# Patient Record
Sex: MALE | Race: WHITE | NOT HISPANIC OR LATINO | Employment: OTHER | URBAN - METROPOLITAN AREA
[De-identification: names, ages, dates, MRNs, and addresses within clinical notes are randomized per-mention and may not be internally consistent; named-entity substitution may affect disease eponyms.]

---

## 2018-05-17 ENCOUNTER — OFFICE VISIT (OUTPATIENT)
Dept: NEUROSURGERY | Facility: CLINIC | Age: 68
End: 2018-05-17

## 2018-05-17 VITALS
TEMPERATURE: 98.9 F | HEIGHT: 73 IN | WEIGHT: 218 LBS | BODY MASS INDEX: 28.89 KG/M2 | SYSTOLIC BLOOD PRESSURE: 128 MMHG | DIASTOLIC BLOOD PRESSURE: 68 MMHG

## 2018-05-17 DIAGNOSIS — I73.9 CLAUDICATION IN PERIPHERAL VASCULAR DISEASE (HCC): ICD-10-CM

## 2018-05-17 DIAGNOSIS — M79.604 PAIN IN BOTH LOWER EXTREMITIES: ICD-10-CM

## 2018-05-17 DIAGNOSIS — M79.605 PAIN IN BOTH LOWER EXTREMITIES: ICD-10-CM

## 2018-05-17 DIAGNOSIS — M53.9 MULTILEVEL DEGENERATIVE DISC DISEASE: Primary | ICD-10-CM

## 2018-05-17 PROCEDURE — 99204 OFFICE O/P NEW MOD 45 MIN: CPT | Performed by: NEUROLOGICAL SURGERY

## 2018-05-17 RX ORDER — VALSARTAN 160 MG/1
TABLET ORAL
COMMUNITY
Start: 2018-05-01

## 2018-05-17 RX ORDER — IBUPROFEN 600 MG/1
600 TABLET ORAL EVERY 6 HOURS PRN
COMMUNITY

## 2018-05-17 NOTE — PROGRESS NOTES
NAME: RUI WILLETT JR.   DOS: 2018  : 1950  PCP: Berto French MD    Chief Complaint:  Back and right lower extremity pain  Chief Complaint   Patient presents with   • Back Pain       History of Present Illness:  67 y.o. male   This is a very pleasant 67-year-old smoker who is a history of chronic axial back pain for a number of years.  More recently in the last few years he's experiencing increasing back pain with radiation to the right buttock hip and leg.  He has history of significant trauma in the popliteal fossa but nothing recently to suggest worsening in that region.  He says that walking feels like he is standing on gravel in the right lower extremity is alleviated it sometimes with standing and sometimes with sitting.  Sometimes if he bends over a cart it is better as well.    He denies any cauda equina syndrome he's here for evaluation    PMHX  Allergies:  No Known Allergies  Medications    Current Outpatient Prescriptions:   •  ibuprofen (ADVIL,MOTRIN) 600 MG tablet, Take 600 mg by mouth Every 6 (Six) Hours As Needed for Mild Pain ., Disp: , Rfl:   •  valsartan (DIOVAN) 160 MG tablet, , Disp: , Rfl:   Past Medical History:  Past Medical History:   Diagnosis Date   • Hypertension      Past Surgical History:  Past Surgical History:   Procedure Laterality Date   • CHOLECYSTECTOMY       Social Hx:  Social History   Substance Use Topics   • Smoking status: Current Every Day Smoker     Packs/day: 1.00     Types: Cigarettes   • Smokeless tobacco: Never Used   • Alcohol use Yes     Family Hx:  Family History   Problem Relation Age of Onset   • No Known Problems Mother    • Heart attack Father      Review of Systems:        Review of Systems   Constitutional: Negative for activity change, appetite change, chills, diaphoresis, fatigue, fever and unexpected weight change.   HENT: Positive for hearing loss and tinnitus. Negative for congestion, dental problem, drooling, ear discharge, ear  pain, facial swelling, mouth sores, nosebleeds, postnasal drip, rhinorrhea, sinus pressure, sneezing, sore throat, trouble swallowing and voice change.    Eyes: Negative for photophobia, pain, discharge, redness, itching and visual disturbance.   Respiratory: Positive for cough and shortness of breath. Negative for apnea, choking, chest tightness, wheezing and stridor.    Cardiovascular: Negative for chest pain, palpitations and leg swelling.   Gastrointestinal: Negative for abdominal distention, abdominal pain, anal bleeding, blood in stool, constipation, diarrhea, nausea, rectal pain and vomiting.   Endocrine: Negative for cold intolerance, heat intolerance, polydipsia, polyphagia and polyuria.   Genitourinary: Negative for decreased urine volume, difficulty urinating, dysuria, enuresis, flank pain, frequency, genital sores, hematuria and urgency.   Musculoskeletal: Positive for back pain. Negative for arthralgias, gait problem, joint swelling, myalgias, neck pain and neck stiffness.   Skin: Negative for color change, pallor, rash and wound.   Allergic/Immunologic: Negative for environmental allergies, food allergies and immunocompromised state.   Neurological: Positive for numbness. Negative for dizziness, tremors, seizures, syncope, facial asymmetry, speech difficulty, weakness, light-headedness and headaches.   Hematological: Negative for adenopathy. Does not bruise/bleed easily.   Psychiatric/Behavioral: Negative for agitation, behavioral problems, confusion, decreased concentration, dysphoric mood, hallucinations, self-injury, sleep disturbance and suicidal ideas. The patient is not nervous/anxious and is not hyperactive.         Review of systemsI have reviewed this note template and all pertinent parts of the review of systems social, family history, surgical history and medication list    Physical Examination:  Vitals:    05/17/18 0919   BP: 128/68   Temp: 98.9 °F (37.2 °C)      General Appearance:   Well  developed, well nourished, well groomed, alert, and cooperative.  Neurological examination:  Neurologic Exam  Vital signs were reviewed and documented in the chart  Patient appeared in good neurologic function with normal comprehension fluent speech  Mood and affect are normal  Sense of smell deferred    Pupils symmetric equally reactive funduscopic exam not visualized   Visual fields intact to confrontation  Extraocular movements intact  Face motor function is symmetric  Facial sensations normal  Hearing intact to finger rub hearing intact to finger rub  Tongue is midline  Palate symmetric  Swallowing normal  Shoulder shrug normal    Muscle bulk and tone normal  5 out of 5 strength no motor drift  Gait normal intact  Negative Romberg  No clonus long tract signs or myelopathy  Operative fossa is with a large gash there is no palpable pulse    Vibratory sensations decreased throughout with normal cold sensation in the hands and lower extremities    He has relatively good reflexes at the knees they are absent at the ankles he has no bruits in the femoral area but he has decreased pulses in his right lower extremity  Reflexes symmetric  No edema noted and extremities skin appears normal    Straight leg raise sign absent  No signs of intrinsic hip dysfunction  Back is without any lesions or abnormality  Feet are warm and well perfused but again with poor right peripheral pulse        Review of Imaging/DATA:  Reviewed his MRIs get intraforaminal significant disease at L5-S1 with multilevel degenerative disc disease and scoliosis  Diagnoses/Plan:    Mr. Farrell is a 67 y.o. male   He clearly has claudication he's got vascular risk factors he's got a good femoral pulse but absent dorsalis pedis on the right I think he needs an ankle brachial index of his right lower extremity especially given his right-sided popliteal trauma I didn't hear a bruit at this area    His MRI shows intraforaminal right-sided L5-S1 disease.    I  explained the importance of smoking cessation to the patient explaining that smoking decreases the efficacy of surgical therapies not to mention the general health risks.  In addition to this when contemplating fusion surgeries smoking decreases fusion rates and leads to poor outcome, and contributes to complication rate.    We also check lumbar flexion extension film refer him for physical therapy and get an EMG nerve conduction study to confirm his neuropathy which is undoubtedly presents.  I didn't detect any myelopathic findings will see him back in about 6 weeks he may be a candidate for transforaminal decompression on the right

## 2018-06-28 ENCOUNTER — OFFICE VISIT (OUTPATIENT)
Dept: NEUROSURGERY | Facility: CLINIC | Age: 68
End: 2018-06-28

## 2018-06-28 VITALS
WEIGHT: 216 LBS | SYSTOLIC BLOOD PRESSURE: 142 MMHG | HEIGHT: 73 IN | DIASTOLIC BLOOD PRESSURE: 78 MMHG | TEMPERATURE: 97.9 F | BODY MASS INDEX: 28.63 KG/M2

## 2018-06-28 DIAGNOSIS — M53.9 MULTILEVEL DEGENERATIVE DISC DISEASE: ICD-10-CM

## 2018-06-28 DIAGNOSIS — M48.061 SPINAL STENOSIS OF LUMBAR REGION WITHOUT NEUROGENIC CLAUDICATION: Primary | ICD-10-CM

## 2018-06-28 DIAGNOSIS — M54.16 LUMBAR RADICULOPATHY: ICD-10-CM

## 2018-06-28 PROCEDURE — 99214 OFFICE O/P EST MOD 30 MIN: CPT | Performed by: NEUROLOGICAL SURGERY

## 2018-06-28 NOTE — PROGRESS NOTES
NAME: RUI WILLETT JR.   DOS: 2018  : 1950  PCP: Berto French MD    Chief Complaint: Follow-up right lower extremity pain bilateral numbness  Chief Complaint   Patient presents with   • Back Pain     F/u EMG/FABI/XR       History of Present Illness:  67 y.o. male   This is a 67-year-old gentleman with a history of bilateral but mostly right-sided hip and leg pain the pain radiates down the leg it's consistent with some overtones of neurogenic claudication he is overall getting better with physical therapy he's here for follow-up he continues to smoke    PMHX  Allergies:  No Known Allergies  Medications    Current Outpatient Prescriptions:   •  ibuprofen (ADVIL,MOTRIN) 600 MG tablet, Take 600 mg by mouth Every 6 (Six) Hours As Needed for Mild Pain ., Disp: , Rfl:   •  valsartan (DIOVAN) 160 MG tablet, , Disp: , Rfl:   Past Medical History:  Past Medical History:   Diagnosis Date   • Hypertension      Past Surgical History:  Past Surgical History:   Procedure Laterality Date   • CHOLECYSTECTOMY       Social Hx:  Social History   Substance Use Topics   • Smoking status: Current Every Day Smoker     Packs/day: 1.00     Types: Cigarettes   • Smokeless tobacco: Never Used   • Alcohol use Yes     Family Hx:  Family History   Problem Relation Age of Onset   • No Known Problems Mother    • Heart attack Father      Review of Systems:        Review of Systems   Constitutional: Negative for activity change, appetite change, chills, diaphoresis, fatigue, fever and unexpected weight change.   HENT: Positive for hearing loss and tinnitus. Negative for congestion, dental problem, drooling, ear discharge, ear pain, facial swelling, mouth sores, nosebleeds, postnasal drip, rhinorrhea, sinus pressure, sneezing, sore throat, trouble swallowing and voice change.    Eyes: Negative for photophobia, pain, discharge, redness, itching and visual disturbance.   Respiratory: Positive for cough and shortness of breath.  Negative for apnea, choking, chest tightness, wheezing and stridor.    Cardiovascular: Negative for chest pain, palpitations and leg swelling.   Gastrointestinal: Negative for abdominal distention, abdominal pain, anal bleeding, blood in stool, constipation, diarrhea, nausea, rectal pain and vomiting.   Endocrine: Negative for cold intolerance, heat intolerance, polydipsia, polyphagia and polyuria.   Genitourinary: Negative for decreased urine volume, difficulty urinating, dysuria, enuresis, flank pain, frequency, genital sores, hematuria and urgency.   Musculoskeletal: Positive for back pain. Negative for arthralgias, gait problem, joint swelling, myalgias, neck pain and neck stiffness.   Skin: Negative for color change, pallor, rash and wound.   Allergic/Immunologic: Negative for environmental allergies, food allergies and immunocompromised state.   Neurological: Positive for numbness. Negative for dizziness, tremors, seizures, syncope, facial asymmetry, speech difficulty, weakness, light-headedness and headaches.   Hematological: Negative for adenopathy. Does not bruise/bleed easily.   Psychiatric/Behavioral: Negative for agitation, behavioral problems, confusion, decreased concentration, dysphoric mood, hallucinations, self-injury, sleep disturbance and suicidal ideas. The patient is not nervous/anxious and is not hyperactive.             Physical Examination:  Vitals:    06/28/18 1037   BP: 142/78   Temp: 97.9 °F (36.6 °C)      General Appearance:   Well developed, well nourished, well groomed, alert, and cooperative.  Neurological examination:  Neurologic Exam  He has good strength in his lower extremities he has no evidence of gross hyperreflexia but he does have brisk knee reflexes he has no clonus his gait is wide-based stable and normal    Review of Imaging/DATA:  I reviewed his old MRIs and x-rays he has interforaminal L5-S1 disease on the right side EMG nerve conduction study confirms an L5-S1 chronic  radiculopathy and he has an FABI examination that unremarkable for perfusion dependent diagnosis of vascular disease.    Diagnoses/Plan:    Mr. Farrell is a 67 y.o. male this patient represents with a history of some neurogenic claudication symptoms referrable to right-sided probable intraforaminal disease I think that he is better in terms of how he was at her last visit particular with physical therapy and nonsteroidal anti-inflammatories.  From my standpoint I think he needs to continue to work on smoking cessation.  I explained the importance of smoking cessation to the patient explaining that smoking decreases the efficacy of surgical therapies not to mention the general health risks.  In addition to this when contemplating fusion surgeries smoking decreases fusion rates and leads to poor outcome, and contributes to complication rate..    I think he may be a candidate for exploration of his right transforaminal area at L5-S1 but I'm really reluctant to offer him that surgery other than as a last option many times patients require lumbar fusions at this area hip and the facet arthropathy the contributes to the pressure I think that there is nothing on his scans that leads me to believe that he should be able to continue his current activities additionally perhaps a transforaminal block would be in order if his symptoms persist.  A be a pleasure to provide him follow up anytime in the future he's get a call me if he needs to be seen again

## 2023-11-20 ENCOUNTER — OFFICE VISIT (OUTPATIENT)
Dept: CARDIAC SURGERY | Facility: CLINIC | Age: 73
End: 2023-11-20
Payer: MEDICARE

## 2023-11-20 VITALS
HEIGHT: 73 IN | TEMPERATURE: 97.8 F | DIASTOLIC BLOOD PRESSURE: 62 MMHG | HEART RATE: 78 BPM | WEIGHT: 202.2 LBS | SYSTOLIC BLOOD PRESSURE: 120 MMHG | BODY MASS INDEX: 26.8 KG/M2 | OXYGEN SATURATION: 99 %

## 2023-11-20 DIAGNOSIS — R91.8 LUNG NODULES: Primary | ICD-10-CM

## 2023-11-20 PROCEDURE — 99204 OFFICE O/P NEW MOD 45 MIN: CPT | Performed by: THORACIC SURGERY (CARDIOTHORACIC VASCULAR SURGERY)

## 2023-11-20 RX ORDER — OLMESARTAN MEDOXOMIL 20 MG/1
TABLET ORAL
COMMUNITY
Start: 2023-11-15

## 2023-11-20 RX ORDER — MONTELUKAST SODIUM 10 MG/1
TABLET ORAL
COMMUNITY

## 2023-11-20 RX ORDER — LEVOTHYROXINE SODIUM 0.03 MG/1
TABLET ORAL
COMMUNITY
Start: 2023-11-06

## 2023-11-21 DIAGNOSIS — R91.1 LUNG NODULE: Primary | ICD-10-CM

## 2023-11-22 ENCOUNTER — PATIENT ROUNDING (BHMG ONLY) (OUTPATIENT)
Dept: CARDIAC SURGERY | Facility: CLINIC | Age: 73
End: 2023-11-22
Payer: MEDICARE

## 2023-11-22 NOTE — PROGRESS NOTES
November 22, 2023    Hello, may I speak with Edvin Farrell Jr.?    My name is Virginia    I am  with [facility]  [address]  [facility] [address] [phone].    Before we get started may I verify your date of birth? 1950    I am calling to officially welcome you to our practice and ask about your recent visit. Is this a good time to talk? yes    Tell me about your visit with us. What things went well?  Every thing went well.        We're always looking for ways to make our patients' experiences even better. Do you have recommendations on ways we may improve?  no    Overall were you satisfied with your first visit to our practice? yes       I appreciate you taking the time to speak with me today. Is there anything else I can do for you? no      Thank you, and have a great day.

## 2023-12-05 ENCOUNTER — TELEPHONE (OUTPATIENT)
Dept: INFUSION THERAPY | Facility: HOSPITAL | Age: 73
End: 2023-12-05
Payer: MEDICARE

## 2023-12-05 NOTE — TELEPHONE ENCOUNTER
Pt contacted as pre-procedure phone call prior to planned right lung biopsy for 12/7/23. Reviewed with patient arrival time, location, nothing to eat or drink by mouth after midnight Wednesday, okay to take blood pressure medications morning of procedure with a small sip of water,  needed, reviewed procedure instructions and allowed time for questions, and reviewed home medications, allergies, and medical history.

## 2023-12-07 ENCOUNTER — HOSPITAL ENCOUNTER (OUTPATIENT)
Dept: CT IMAGING | Facility: HOSPITAL | Age: 73
Discharge: HOME | End: 2023-12-07
Payer: MEDICARE

## 2023-12-07 VITALS
DIASTOLIC BLOOD PRESSURE: 66 MMHG | SYSTOLIC BLOOD PRESSURE: 115 MMHG | HEART RATE: 73 BPM | HEIGHT: 73 IN | OXYGEN SATURATION: 95 % | WEIGHT: 196.8 LBS | TEMPERATURE: 98.9 F | RESPIRATION RATE: 18 BRPM | BODY MASS INDEX: 26.08 KG/M2

## 2023-12-07 DIAGNOSIS — R91.1 LUNG NODULE: ICD-10-CM

## 2023-12-07 LAB
BASOPHILS # BLD AUTO: 0.05 10*3/MM3 (ref 0–0.2)
BASOPHILS NFR BLD AUTO: 0.9 % (ref 0–1.5)
DEPRECATED RDW RBC AUTO: 51.2 FL (ref 37–54)
EOSINOPHIL # BLD AUTO: 0.09 10*3/MM3 (ref 0–0.4)
EOSINOPHIL NFR BLD AUTO: 1.7 % (ref 0.3–6.2)
ERYTHROCYTE [DISTWIDTH] IN BLOOD BY AUTOMATED COUNT: 13.6 % (ref 12.3–15.4)
HCT VFR BLD AUTO: 37.3 % (ref 37.5–51)
HGB BLD-MCNC: 12.4 G/DL (ref 13–17.7)
IMM GRANULOCYTES # BLD AUTO: 0.02 10*3/MM3 (ref 0–0.05)
IMM GRANULOCYTES NFR BLD AUTO: 0.4 % (ref 0–0.5)
INR PPP: 1.11 (ref 0.89–1.12)
LYMPHOCYTES # BLD AUTO: 1.01 10*3/MM3 (ref 0.7–3.1)
LYMPHOCYTES NFR BLD AUTO: 19.2 % (ref 19.6–45.3)
MCH RBC QN AUTO: 33.9 PG (ref 26.6–33)
MCHC RBC AUTO-ENTMCNC: 33.2 G/DL (ref 31.5–35.7)
MCV RBC AUTO: 101.9 FL (ref 79–97)
MONOCYTES # BLD AUTO: 0.69 10*3/MM3 (ref 0.1–0.9)
MONOCYTES NFR BLD AUTO: 13.1 % (ref 5–12)
NEUTROPHILS NFR BLD AUTO: 3.41 10*3/MM3 (ref 1.7–7)
NEUTROPHILS NFR BLD AUTO: 64.7 % (ref 42.7–76)
NRBC BLD AUTO-RTO: 0 /100 WBC (ref 0–0.2)
PLATELET # BLD AUTO: 230 10*3/MM3 (ref 140–450)
PMV BLD AUTO: 10 FL (ref 6–12)
PROTHROMBIN TIME: 14.4 SECONDS (ref 12.2–14.5)
RBC # BLD AUTO: 3.66 10*6/MM3 (ref 4.14–5.8)
WBC NRBC COR # BLD AUTO: 5.27 10*3/MM3 (ref 3.4–10.8)

## 2023-12-07 PROCEDURE — 85610 PROTHROMBIN TIME: CPT | Performed by: RADIOLOGY

## 2023-12-07 PROCEDURE — 25010000002 MIDAZOLAM PER 1 MG: Performed by: RADIOLOGY

## 2023-12-07 PROCEDURE — 25010000002 LIDOCAINE 1 % SOLUTION: Performed by: RADIOLOGY

## 2023-12-07 PROCEDURE — 85025 COMPLETE CBC W/AUTO DIFF WBC: CPT | Performed by: RADIOLOGY

## 2023-12-07 PROCEDURE — 25010000002 FENTANYL CITRATE (PF) 50 MCG/ML SOLUTION: Performed by: RADIOLOGY

## 2023-12-07 PROCEDURE — 99152 MOD SED SAME PHYS/QHP 5/>YRS: CPT

## 2023-12-07 RX ORDER — FENTANYL CITRATE 50 UG/ML
INJECTION, SOLUTION INTRAMUSCULAR; INTRAVENOUS AS NEEDED
Status: COMPLETED | OUTPATIENT
Start: 2023-12-07 | End: 2023-12-07

## 2023-12-07 RX ORDER — MIDAZOLAM HYDROCHLORIDE 1 MG/ML
INJECTION INTRAMUSCULAR; INTRAVENOUS AS NEEDED
Status: COMPLETED | OUTPATIENT
Start: 2023-12-07 | End: 2023-12-07

## 2023-12-07 RX ORDER — MIDAZOLAM HYDROCHLORIDE 1 MG/ML
INJECTION INTRAMUSCULAR; INTRAVENOUS
Status: DISPENSED
Start: 2023-12-07 | End: 2023-12-07

## 2023-12-07 RX ORDER — LIDOCAINE HYDROCHLORIDE 10 MG/ML
10 INJECTION, SOLUTION INFILTRATION; PERINEURAL ONCE
Status: COMPLETED | OUTPATIENT
Start: 2023-12-07 | End: 2023-12-07

## 2023-12-07 RX ORDER — FENTANYL CITRATE 50 UG/ML
INJECTION, SOLUTION INTRAMUSCULAR; INTRAVENOUS
Status: DISPENSED
Start: 2023-12-07 | End: 2023-12-07

## 2023-12-07 RX ORDER — MORPHINE SULFATE 2 MG/ML
2 INJECTION, SOLUTION INTRAMUSCULAR; INTRAVENOUS
Status: DISCONTINUED | OUTPATIENT
Start: 2023-12-07 | End: 2023-12-08 | Stop reason: HOSPADM

## 2023-12-07 RX ORDER — SODIUM CHLORIDE 0.9 % (FLUSH) 0.9 %
10 SYRINGE (ML) INJECTION EVERY 12 HOURS SCHEDULED
Status: DISCONTINUED | OUTPATIENT
Start: 2023-12-07 | End: 2023-12-08 | Stop reason: HOSPADM

## 2023-12-07 RX ORDER — HYDROCODONE BITARTRATE AND ACETAMINOPHEN 5; 325 MG/1; MG/1
1 TABLET ORAL EVERY 4 HOURS PRN
Status: DISCONTINUED | OUTPATIENT
Start: 2023-12-07 | End: 2023-12-08 | Stop reason: HOSPADM

## 2023-12-07 RX ORDER — SODIUM CHLORIDE 0.9 % (FLUSH) 0.9 %
10 SYRINGE (ML) INJECTION AS NEEDED
Status: DISCONTINUED | OUTPATIENT
Start: 2023-12-07 | End: 2023-12-08 | Stop reason: HOSPADM

## 2023-12-07 RX ADMIN — MIDAZOLAM HYDROCHLORIDE 1 MG: 1 INJECTION, SOLUTION INTRAMUSCULAR; INTRAVENOUS at 08:40

## 2023-12-07 RX ADMIN — FENTANYL CITRATE 50 MCG: 50 INJECTION, SOLUTION INTRAMUSCULAR; INTRAVENOUS at 08:39

## 2023-12-07 RX ADMIN — LIDOCAINE HYDROCHLORIDE 10 ML: 10 INJECTION, SOLUTION INFILTRATION; PERINEURAL at 08:59

## 2023-12-07 NOTE — PRE-PROCEDURE NOTE
"[facility]   Vascular Interventional Radiology  History & Physicial        Patient Name:Edvin Farrell Jr.    : 1950    MRN: [id number]    Primary Care Physician: Berto French MD    Referring Physician: Killian Hilton MD     Date of admission: 2023    Subjective     Reason for Consult: Lung biopsy    History of Present Illness     Edvin Farrell Jr. is a 73 y.o. male referred to IR as noted above.      Active Hospital Problems:  There are no active hospital problems to display for this patient.      Personal History     Past Medical History:   Diagnosis Date    Hypertension     Hypothyroidism        Past Surgical History:   Procedure Laterality Date    CHOLECYSTECTOMY      PELVIC FRACTURE SURGERY         Family History: His family history includes Heart attack in his father; No Known Problems in his mother.     Social History: He  reports that he has been smoking cigarettes. He has a 50.00 pack-year smoking history. He has never used smokeless tobacco. He reports that he does not drink alcohol and does not use drugs.    Home Medications:  ibuprofen, levothyroxine, montelukast, olmesartan, and valsartan    Current Medications:    fentaNYL citrate (PF)    midazolam    sodium chloride    sodium chloride     Allergies:  No Known Allergies    Review of Systems    IR Procedure pertinent significant findings are mentioned in the PMH and PSH above.    Objective     Visit Vitals  /70   Pulse 96   Temp 98.9 °F (37.2 °C)   Resp 18   Ht 185.4 cm (73\")   Wt 89.3 kg (196 lb 12.8 oz)   SpO2 94%   BMI 25.96 kg/m²        Physical Exam    A&Ox3.   Able to communicate  No Apparent Distress  Average physique   CVS: VS as noted. Chart reviewed. Stable for the procedure.  Respiratory: Non labored breathing. No signs of respiratory compromise.    Result Review      I have personally reviewed the results from the time of this admission to 2023 08:03 EST and agree with these findings.  [x]  Laboratory  [] " " Microbiology  [x]  Radiology  []  EKG/Telemetry   []  Cardiology/Vascular   []  Pathology  []  Old records  []  Other:    Most notable findings include: As noted:    Results from last 7 days   Lab Units 12/07/23  0723   INR  1.11   WBC 10*3/mm3 5.27   HEMOGLOBIN g/dL 12.4*   HEMATOCRIT % 37.3*   PLATELETS 10*3/mm3 230                   CrCl cannot be calculated (No successful lab value found.). No results found for: \"CREATININE\"    No results found for: \"COVID19\"     No results found for: \"PREGTESTUR\", \"PREGSERUM\", \"HCG\", \"HCGQUANT\"     ASA SCALE ASSESSMENT (applicable ONLY if sedation planned):   1     MALLAMPATI CLASSIFICATION (applicable ONLY if sedation planned):   1    Assessment / Plan     Edvin Farrell Jr. is a 73 y.o. male referred to the IR service with above problem.    Plan:   As above.    Notice: The note was created before the performance of the procedure. It might have been left in the pending status and signed off after the procedure. The time stamp on the note may be misleading.    Raheem Smith MD   Vascular Interventional Radiology  12/07/23   8:03 AM EST     "

## 2023-12-07 NOTE — DISCHARGE INSTRUCTIONS
No heavy lifting or strenuous activity x 3 days.     Resume regular diet and medications.    May remove dressing and shower tomorrow.     Ordering physician will discuss biopsy results with you     If you develop chest pain or difficulty breathing, call 911 or go the emergency room

## 2023-12-07 NOTE — POST-PROCEDURE NOTE
The following procedure was performed: R lung bx    Please see corresponding Radiology report for in detail procedural information. The Radiology report will be dictated shortly, if not done so already. Please see the IR RN note for the information regarding medicines administered if any, deisy-procedural vitals and I/O information.

## 2023-12-07 NOTE — NURSING NOTE
Image guided right lung biopsy per Dr Smith. Patient tolerated procedure well. VSS. 1mg of versded and 50mcg of fentanyl administered for sedation time of 12 min. . Report called to [facility].

## 2023-12-08 ENCOUNTER — TELEPHONE (OUTPATIENT)
Dept: INFUSION THERAPY | Facility: HOSPITAL | Age: 73
End: 2023-12-08
Payer: MEDICARE

## 2023-12-11 LAB
CYTO UR: NORMAL
LAB AP CASE REPORT: NORMAL
LAB AP CLINICAL INFORMATION: NORMAL
LAB AP DIAGNOSIS COMMENT: NORMAL
PATH REPORT.ADDENDUM SPEC: NORMAL
PATH REPORT.FINAL DX SPEC: NORMAL
PATH REPORT.GROSS SPEC: NORMAL

## 2023-12-14 PROBLEM — R91.8 LUNG MASS: Status: ACTIVE | Noted: 2023-12-14

## 2023-12-18 NOTE — PROGRESS NOTES
[facility] Cardiothoracic Surgery Office Follow Up Note     Date of Encounter: 2023     Name: Edvin Farrell Jr.  : 1950     Referred By: No ref. provider found  PCP: Berto French MD    Chief Complaint:    Chief Complaint   Patient presents with    Lung Nodule     Follow up after right lung biopsy.       Subjective      History of Present Illness:    Edvin Farrell Jr. is a 73 y.o. male with a past medical history of current smoker, hypertension, hypothyroidism, and family history of bronchogenic carcinoma.  He was referred to Dr. Hilton on 2023 and was noted to have multiple pulmonary nodules largest noted on the right and underwent needle biopsy on 23.  Pathology was consistent with squamous cell carcinoma.  He presents today in office for follow-up and to discuss pathology results.  He states he has been doing well at home.  Denies any hemoptysis, unintentional weight loss, or lymphadenopathy. He does unfortunately continue to smoke, he states he does want to quit.    Review of Systems:  Review of Systems   Constitutional: Negative. Negative for chills, decreased appetite, diaphoresis, fever, malaise/fatigue, night sweats and weight loss.   HENT: Negative.  Negative for congestion, hoarse voice, sore throat and stridor.    Cardiovascular: Negative.  Negative for chest pain, claudication, dyspnea on exertion, irregular heartbeat, leg swelling, near-syncope, orthopnea, palpitations, paroxysmal nocturnal dyspnea and syncope.   Respiratory:  Positive for cough and sputum production. Negative for hemoptysis, shortness of breath, sleep disturbances due to breathing, snoring and wheezing.    Hematologic/Lymphatic: Negative.  Negative for adenopathy and bleeding problem. Does not bruise/bleed easily.   Skin: Negative.  Negative for color change, dry skin, itching, poor wound healing and rash.   Musculoskeletal:  Positive for back pain. Negative for arthritis, falls and muscle  weakness.   Gastrointestinal: Negative.  Negative for abdominal pain, anorexia, constipation, diarrhea, hematochezia, melena, nausea and vomiting.   Neurological: Negative.  Negative for difficulty with concentration, disturbances in coordination, dizziness, loss of balance, numbness, seizures, vertigo and weakness.   Psychiatric/Behavioral: Negative.  Negative for altered mental status, depression, memory loss and substance abuse. The patient does not have insomnia and is not nervous/anxious.    Allergic/Immunologic: Negative.  Negative for persistent infections.       I have reviewed the following portions of the patient's history: problem list, current medications, allergies, past surgical history, past medical history, past social history, past family history, and ROS and confirm it's accurate.    Allergies:  No Known Allergies    Medications:      Current Outpatient Medications:     ibuprofen (ADVIL,MOTRIN) 600 MG tablet, Take 1 tablet by mouth Every 6 (Six) Hours As Needed for Mild Pain., Disp: , Rfl:     levothyroxine (SYNTHROID, LEVOTHROID) 25 MCG tablet, , Disp: , Rfl:     montelukast (SINGULAIR) 10 MG tablet, , Disp: , Rfl:     olmesartan (BENICAR) 20 MG tablet, , Disp: , Rfl:     valsartan (DIOVAN) 160 MG tablet, , Disp: , Rfl:     History:   Past Medical History:   Diagnosis Date    Hypertension     Hypothyroidism        Past Surgical History:   Procedure Laterality Date    CHOLECYSTECTOMY      PELVIC FRACTURE SURGERY         Social History     Socioeconomic History    Marital status:     Number of children: 1   Tobacco Use    Smoking status: Every Day     Packs/day: 1.00     Years: 50.00     Additional pack years: 0.00     Total pack years: 50.00     Types: Cigarettes    Smokeless tobacco: Never    Tobacco comments:     Pt states  that he smoked 1 ppd for 50 plus years, states that this last week 11/20/23, that he has begun to cut back to 1/2 ppd   Vaping Use    Vaping Use: Never used   Substance  "and Sexual Activity    Alcohol use: Never     Comment: pt states that he has not used alcohol in the last 5 years    Drug use: No    Sexual activity: Defer        Family History   Problem Relation Age of Onset    No Known Problems Mother     Heart attack Father        Objective     Physical Exam:  Vitals:    12/20/23 0934   BP: 142/74   BP Location: Right arm   Patient Position: Sitting   Pulse: 88   Temp: 97.3 °F (36.3 °C)   SpO2: 99%   Weight: 89.4 kg (197 lb)   Height: 185.4 cm (73\")  Comment: patient reports      Body mass index is 25.99 kg/m².    Physical Exam  Constitutional:       Appearance: Normal appearance.   HENT:      Head: Normocephalic.      Mouth/Throat:      Pharynx: Oropharynx is clear.   Eyes:      Pupils: Pupils are equal, round, and reactive to light.   Cardiovascular:      Rate and Rhythm: Normal rate.   Pulmonary:      Effort: Pulmonary effort is normal.   Abdominal:      General: Bowel sounds are normal.   Musculoskeletal:         General: Normal range of motion.      Cervical back: Normal range of motion.   Skin:     General: Skin is warm.   Neurological:      General: No focal deficit present.      Mental Status: He is alert.   Psychiatric:         Mood and Affect: Mood normal.         Imaging/Labs:  CT Needle Biopsy Lung    Result Date: 12/7/2023  Impression:                                                              Successful CT guided core biopsy of right lung upper lobe mass. Thank you for the opportunity to assist in the care of your patient. Electronically Signed: Raheem Smith MD  12/7/2023 10:13 AM EST  Workstation ID: [id number]      Results for orders placed in visit on 05/23/18    SCANNED VASCULAR STUDIES      Assessment / Plan    Edvin Farrell JrRenuka is a 73 y.o. male with a past medical history of current smoker, hypertension, hypothyroidism, and family history of bronchogenic carcinoma.  He was referred to Dr. Hilton on 11/20/2023 and was noted to have multiple pulmonary nodules " largest noted on the right and underwent needle biopsy on 12/7/23.  Pathology was consistent with squamous cell carcinoma.  He presents today in office for follow-up and to discuss pathology results.  He states he has been doing well at home.  Denies any hemoptysis, unintentional weight loss, or lymphadenopathy. He does unfortunately continue to smoke, he states he does want to quit.    Assessment / Plan:  Diagnoses and all orders for this visit:    1. Squamous cell carcinoma lung, right (Primary)    2. Tobacco abuse     S/p needle biopsy done on 12/7/2023  Discussed pathology with patient as well as need for oncology referral.  He demonstrated understanding and requested referral to be placed to Dr. Toby Bobo with [facility]. Explained to patient and spouse we will see as needed after oncology evaluation.   I advised the patient to quit smoking. He expressed desire to quit and wanted to speak with his PCP regarding medication to help.      Follow Up:   As needed basis    Or sooner for any further concerns or worsening sign and symptoms. If unable to reach us in the office please dial 911 or go to the nearest emergency department.      ANDREAS German  [facility] Cardiothoracic Surgery    Time Spent: I spent 50 minutes caring for Edvin on this date of service. This time includes time spent by me in the following activities: preparing for the visit, reviewing tests, obtaining and/or reviewing a separately obtained history, performing a medically appropriate examination and/or evaluation, counseling and educating the patient/family/caregiver, referring and communicating with other health care professionals, documenting information in the medical record, and independently interpreting results and communicating that information with the patient/family/caregiver.

## 2023-12-20 ENCOUNTER — OFFICE VISIT (OUTPATIENT)
Dept: CARDIAC SURGERY | Facility: CLINIC | Age: 73
End: 2023-12-20
Payer: MEDICARE

## 2023-12-20 VITALS
HEIGHT: 73 IN | SYSTOLIC BLOOD PRESSURE: 142 MMHG | DIASTOLIC BLOOD PRESSURE: 74 MMHG | BODY MASS INDEX: 26.11 KG/M2 | OXYGEN SATURATION: 99 % | WEIGHT: 197 LBS | HEART RATE: 88 BPM | TEMPERATURE: 97.3 F

## 2023-12-20 DIAGNOSIS — C34.91 SQUAMOUS CELL CARCINOMA LUNG, RIGHT: Primary | ICD-10-CM

## 2023-12-20 DIAGNOSIS — Z72.0 TOBACCO ABUSE: ICD-10-CM

## 2024-01-02 ENCOUNTER — TELEPHONE (OUTPATIENT)
Dept: CARDIAC SURGERY | Facility: CLINIC | Age: 74
End: 2024-01-02
Payer: MEDICARE

## 2024-01-02 NOTE — TELEPHONE ENCOUNTER
Spoke to patient over the phone briefly to ensure he received a phone call from Dr. Bobo's office. He stated he has an appointment 1/4 to see him.